# Patient Record
Sex: FEMALE | Race: BLACK OR AFRICAN AMERICAN
[De-identification: names, ages, dates, MRNs, and addresses within clinical notes are randomized per-mention and may not be internally consistent; named-entity substitution may affect disease eponyms.]

---

## 2017-12-08 ENCOUNTER — HOSPITAL ENCOUNTER (EMERGENCY)
Dept: HOSPITAL 92 - ERS | Age: 22
Discharge: HOME | End: 2017-12-08
Payer: COMMERCIAL

## 2017-12-08 DIAGNOSIS — J06.9: Primary | ICD-10-CM

## 2017-12-08 PROCEDURE — 99283 EMERGENCY DEPT VISIT LOW MDM: CPT

## 2018-01-16 ENCOUNTER — HOSPITAL ENCOUNTER (EMERGENCY)
Dept: HOSPITAL 92 - ERS | Age: 23
Discharge: HOME | End: 2018-01-16
Payer: SELF-PAY

## 2018-01-16 DIAGNOSIS — J02.9: Primary | ICD-10-CM

## 2018-01-16 PROCEDURE — 87430 STREP A AG IA: CPT

## 2018-01-16 PROCEDURE — 96372 THER/PROPH/DIAG INJ SC/IM: CPT

## 2018-01-16 PROCEDURE — 87081 CULTURE SCREEN ONLY: CPT

## 2018-01-18 ENCOUNTER — HOSPITAL ENCOUNTER (EMERGENCY)
Dept: HOSPITAL 92 - ERS | Age: 23
Discharge: HOME | End: 2018-01-18
Payer: COMMERCIAL

## 2018-01-18 DIAGNOSIS — R09.81: ICD-10-CM

## 2018-01-18 DIAGNOSIS — J06.9: Primary | ICD-10-CM

## 2018-01-18 DIAGNOSIS — G43.909: ICD-10-CM

## 2018-01-18 PROCEDURE — 99283 EMERGENCY DEPT VISIT LOW MDM: CPT

## 2018-03-26 ENCOUNTER — HOSPITAL ENCOUNTER (EMERGENCY)
Dept: HOSPITAL 92 - ERS | Age: 23
Discharge: HOME | End: 2018-03-26
Payer: SELF-PAY
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
## 2018-03-26 DIAGNOSIS — S00.83XA: Primary | ICD-10-CM
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
## 2018-03-26 DIAGNOSIS — G43.909: ICD-10-CM

## 2018-03-26 LAB
ALBUMIN SERPL BCG-MCNC: 4.6 G/DL (ref 3.5–5)
ALP SERPL-CCNC: 93 U/L (ref 40–150)
ALT SERPL W P-5'-P-CCNC: 28 U/L (ref 8–55)
ANION GAP SERPL CALC-SCNC: 10 MMOL/L (ref 10–20)
APTT PPP: 31.4 SEC (ref 22.9–36.1)
AST SERPL-CCNC: 38 U/L (ref 5–34)
BASOPHILS # BLD AUTO: 0.1 THOU/UL (ref 0–0.2)
BASOPHILS NFR BLD AUTO: 0.9 % (ref 0–1)
BILIRUB SERPL-MCNC: 0.6 MG/DL (ref 0.2–1.2)
BUN SERPL-MCNC: 17 MG/DL (ref 7–18.7)
CALCIUM SERPL-MCNC: 9.9 MG/DL (ref 7.8–10.44)
CHLORIDE SERPL-SCNC: 107 MMOL/L (ref 98–107)
CK MB SERPL-MCNC: 0.7 NG/ML (ref 0–6.6)
CO2 SERPL-SCNC: 25 MMOL/L (ref 22–29)
CREAT CL PREDICTED SERPL C-G-VRATE: 0 ML/MIN (ref 70–130)
EOSINOPHIL # BLD AUTO: 0.1 THOU/UL (ref 0–0.7)
EOSINOPHIL NFR BLD AUTO: 1.3 % (ref 0–10)
GLOBULIN SER CALC-MCNC: 3.4 G/DL (ref 2.4–3.5)
GLUCOSE SERPL-MCNC: 89 MG/DL (ref 70–105)
HGB BLD-MCNC: 12.7 G/DL (ref 12–16)
INR PPP: 1.1
LYMPHOCYTES # BLD: 2.6 THOU/UL (ref 1.2–3.4)
LYMPHOCYTES NFR BLD AUTO: 42.4 % (ref 21–51)
MCH RBC QN AUTO: 30.7 PG (ref 27–31)
MCV RBC AUTO: 92 FL (ref 81–99)
MONOCYTES # BLD AUTO: 0.6 THOU/UL (ref 0.11–0.59)
MONOCYTES NFR BLD AUTO: 9.1 % (ref 0–10)
NEUTROPHILS # BLD AUTO: 2.9 THOU/UL (ref 1.4–6.5)
NEUTROPHILS NFR BLD AUTO: 46.3 % (ref 42–75)
PLATELET # BLD AUTO: 238 THOU/UL (ref 130–400)
POTASSIUM SERPL-SCNC: 3.6 MMOL/L (ref 3.5–5.1)
PREGS CONTROL BACKGROUND?: (no result)
PREGS CONTROL BAR APPEAR?: YES
PROTHROMBIN TIME: 14 SEC (ref 12–14.7)
RBC # BLD AUTO: 4.12 MILL/UL (ref 4.2–5.4)
SODIUM SERPL-SCNC: 138 MMOL/L (ref 136–145)
SP GR UR STRIP: 1.02 (ref 1–1.04)
TROPONIN I SERPL DL<=0.01 NG/ML-MCNC: (no result) NG/ML (ref ?–0.03)
WBC # BLD AUTO: 6.2 THOU/UL (ref 4.8–10.8)

## 2018-03-26 PROCEDURE — 85025 COMPLETE CBC W/AUTO DIFF WBC: CPT

## 2018-03-26 PROCEDURE — 71260 CT THORAX DX C+: CPT

## 2018-03-26 PROCEDURE — 85610 PROTHROMBIN TIME: CPT

## 2018-03-26 PROCEDURE — 84703 CHORIONIC GONADOTROPIN ASSAY: CPT

## 2018-03-26 PROCEDURE — 70450 CT HEAD/BRAIN W/O DYE: CPT

## 2018-03-26 PROCEDURE — 70486 CT MAXILLOFACIAL W/O DYE: CPT

## 2018-03-26 PROCEDURE — 85730 THROMBOPLASTIN TIME PARTIAL: CPT

## 2018-03-26 PROCEDURE — 82553 CREATINE MB FRACTION: CPT

## 2018-03-26 PROCEDURE — 74177 CT ABD & PELVIS W/CONTRAST: CPT

## 2018-03-26 PROCEDURE — 80053 COMPREHEN METABOLIC PANEL: CPT

## 2018-03-26 PROCEDURE — 81003 URINALYSIS AUTO W/O SCOPE: CPT

## 2018-03-26 PROCEDURE — 93005 ELECTROCARDIOGRAM TRACING: CPT

## 2018-03-26 PROCEDURE — 71045 X-RAY EXAM CHEST 1 VIEW: CPT

## 2018-03-26 PROCEDURE — 96374 THER/PROPH/DIAG INJ IV PUSH: CPT

## 2018-03-26 PROCEDURE — 84484 ASSAY OF TROPONIN QUANT: CPT

## 2018-03-26 PROCEDURE — 96361 HYDRATE IV INFUSION ADD-ON: CPT

## 2018-03-26 NOTE — RAD
SINGLE VIEW OF THE CHEST:

 

Comparison: None. 

 

History: Thrown from a horse with chest pain and chest trauma. 

 

FINDINGS:

Single view of the chest shows a normal sized cardiomediastinal silhouette. There is no evidence of c
onsolidation, mass, or pleural effusion. The bones are unremarkable.

 

IMPRESSION:

No evidence of acute cardiopulmonary disease.

 

POS: [facility]

## 2018-03-26 NOTE — CT
CT OF THE CHEST WITH CONTRAST

LIMITED CT OF THE THORACIC SPINE WITH CONTRAST:

 

Comparison: None. 

 

History: Patient fell off a horse and was trampled by the horse. Patient complains of chest and back 
pain. 

 

Technique: 

1. Multiple contiguous axial images were obtained in a CT of the chest with contrast. Coronal reforma
ts were performed. 

2. Limited CT of the thoracic spine was performed. Sagittal and coronal reformats were created off im
ages obtained in the chest CT. 

 

FINDINGS: 

 

CT CHEST:

Heart is normal in size without focal cardiac abnormality. No hilar or mediastinal lymphadenopathy ar
e seen. 

 

No focal infiltrates are seen in the lungs. No pneumothorax or pleural effusion are seen. 

 

The bones of the chest are unremarkable. The chest wall soft tissues are unremarkable. Please see abd
ominal CT for subdiaphragmatic findings. 

 

LIMITED CT OF THE THORACIC SPINE:

Vertebral bodies and intervertebral body discs demonstrate normal height and alignment without fractu
re or subluxation. No degenerative changes are seen. No prevertebral soft tissue is present. 

 

IMPRESSION: 

No evidence of acute intrathoracic abnormality. No evidence of acute osseous abnormality of the thora
cic spine. 

 

 

POS: [facility]

## 2018-03-26 NOTE — CT
CT FACE WITHOUT CONTRAST:

 

Comparison: None. 

 

History: Patient thrown from a horse with facial pain and trauma. 

 

Technique: Multiple contiguous axial images were obtained in a CT of the face without contrast. Sagit
kriss and coronal reformats were performed. 

 

FINDINGS: 

The bones of the face are without evidence of facial fracture. The paranasal sinuses are well aerated
. Temporomandibular joints are symmetric. The mastoid air cells are well aerated. 

 

The globes and retrobulbar soft tissues are unremarkable. There is left periorbital soft tissue swell
ing. A tongue piercing is incidentally seen. 

 

IMPRESSION: 

No evidence of facial fracture. Dr. Valenzuela notified of findings at 8:18 p.m. on 3-26-18. 

 

POS: [facility]

## 2018-03-26 NOTE — CT
CT ABDOMEN AND PELVIS WITH CONTRAST

CT LIMITED OF THE LUMBOSACRAL SPINE WITH CONTRAST:

 

History: Patient was thrown from a horse and complains of back pain and abdominal pain. 

 

Technique: 

1. Multiple contiguous axial images were obtained in a CT of the abdomen with contrast. Coronal refor
mats were performed. 

2. Limited CT of the lumbosacral spine was performed. Sagittal and coronal reformats were created bas
ed off images obtained in the abdominal and pelvic CT. 

 

FINDINGS: 

The gallbladder is contracted. There is a nonspecific calcification in the right kidney measuring 4 m
m in size. The liver, left kidneys, adrenal glands, spleen, and pancreas are unremarkable. No free ai
r or stranding changes are seen in the abdomen or pelvis. A small amount of free fluid in the pelvis 
is likely physiologic. 

 

The large and small bowel are unremarkable. There is a 2.4 cm hypodensity associated with vaginal wal
l which may represent a cyst. The reproductive organs are otherwise unremarkable. No abdominal or pel
agnes lymphadenopathy are seen. 

 

The bones of the pelvis and visualized lower ribs are unremarkable.

 

LIMITED CT OF THE LUMBOSACRAL SPINE:

The vertebral bodies and intervertebral discs demonstrate normal height and alignment without fractur
e or subluxation. No degenerative changes are seen. No prevertebral soft tissue swelling is present. 


 

IMPRESSION: 

1. No evidence of acute intraabdominal/pelvic abnormality. 

2. Nonobstructing right renal calcification. 

3. Cystic structure associated with the vagina may represent a Bartholin gland cyst. 

4. No evidence of acute osseous abnormality of the lumbosacral spine.

 

POS: [facility]

## 2018-04-18 NOTE — EKG
Test Reason : 

Blood Pressure : ***/*** mmHG

Vent. Rate : 073 BPM     Atrial Rate : 073 BPM

   P-R Int : 178 ms          QRS Dur : 082 ms

    QT Int : 366 ms       P-R-T Axes : 065 080 038 degrees

   QTc Int : 403 ms

 

Normal sinus rhythm with sinus arrhythmia

Normal ECG

 

Confirmed by DARELL CARR ([id number]),  YFN KEN ([id number]) on 4/18/2018 3:05:13 PM

 

Referred By:             Confirmed By:DARELL CARR

## 2018-04-22 ENCOUNTER — HOSPITAL ENCOUNTER (EMERGENCY)
Dept: HOSPITAL 92 - ERS | Age: 23
Discharge: HOME | End: 2018-04-22
Payer: SELF-PAY

## 2018-04-22 DIAGNOSIS — N13.2: Primary | ICD-10-CM

## 2018-04-22 DIAGNOSIS — G43.909: ICD-10-CM

## 2018-04-22 LAB
ALBUMIN SERPL BCG-MCNC: 4.8 G/DL (ref 3.5–5)
ALP SERPL-CCNC: 88 U/L (ref 40–150)
ALT SERPL W P-5'-P-CCNC: 16 U/L (ref 8–55)
AMYLASE SERPL-CCNC: 58 U/L (ref 25–125)
ANION GAP SERPL CALC-SCNC: 14 MMOL/L (ref 10–20)
AST SERPL-CCNC: 20 U/L (ref 5–34)
BASOPHILS # BLD AUTO: 0 THOU/UL (ref 0–0.2)
BASOPHILS NFR BLD AUTO: 0.6 % (ref 0–1)
BILIRUB SERPL-MCNC: 1.9 MG/DL (ref 0.2–1.2)
BUN SERPL-MCNC: 20 MG/DL (ref 7–18.7)
CALCIUM SERPL-MCNC: 9.7 MG/DL (ref 7.8–10.44)
CHLORIDE SERPL-SCNC: 103 MMOL/L (ref 98–107)
CK SERPL-CCNC: 80 U/L (ref 29–168)
CO2 SERPL-SCNC: 24 MMOL/L (ref 22–29)
CREAT CL PREDICTED SERPL C-G-VRATE: 0 ML/MIN (ref 70–130)
CRYSTAL-AUWI FLAG: 0 (ref 0–15)
EOSINOPHIL # BLD AUTO: 0.1 THOU/UL (ref 0–0.7)
EOSINOPHIL NFR BLD AUTO: 1 % (ref 0–10)
GLOBULIN SER CALC-MCNC: 3.5 G/DL (ref 2.4–3.5)
GLUCOSE SERPL-MCNC: 97 MG/DL (ref 70–105)
HEV IGM SER QL: 6.3 (ref 0–7.99)
HGB BLD-MCNC: 13 G/DL (ref 12–16)
HYALINE CASTS #/AREA URNS LPF: (no result) LPF
LIPASE SERPL-CCNC: 6 U/L (ref 8–78)
LYMPHOCYTES # BLD: 2.3 THOU/UL (ref 1.2–3.4)
LYMPHOCYTES NFR BLD AUTO: 38.4 % (ref 21–51)
MCH RBC QN AUTO: 31.6 PG (ref 27–31)
MCV RBC AUTO: 92.1 FL (ref 81–99)
MONOCYTES # BLD AUTO: 0.6 THOU/UL (ref 0.11–0.59)
MONOCYTES NFR BLD AUTO: 9.8 % (ref 0–10)
NEUTROPHILS # BLD AUTO: 3.1 THOU/UL (ref 1.4–6.5)
NEUTROPHILS NFR BLD AUTO: 50.2 % (ref 42–75)
PATHC CAST-AUWI FLAG: 2.1 (ref 0–2.49)
PLATELET # BLD AUTO: 245 THOU/UL (ref 130–400)
POTASSIUM SERPL-SCNC: 3.3 MMOL/L (ref 3.5–5.1)
PREGU CONTROL BACKGROUND?: (no result)
PREGU CONTROL BAR APPEAR?: YES
PROT UR STRIP.AUTO-MCNC: 100 MG/DL
RBC # BLD AUTO: 4.11 MILL/UL (ref 4.2–5.4)
RBC UR QL AUTO: (no result) HPF (ref 0–3)
SODIUM SERPL-SCNC: 138 MMOL/L (ref 136–145)
SP GR UR STRIP: 1.03 (ref 1–1.04)
SPERM-AUWI FLAG: 0 (ref 0–9.9)
WBC # BLD AUTO: 6.1 THOU/UL (ref 4.8–10.8)
WBC UR QL AUTO: (no result) HPF (ref 0–3)
YEAST-AUWI FLAG: 0 (ref 0–25)

## 2018-04-22 PROCEDURE — 81025 URINE PREGNANCY TEST: CPT

## 2018-04-22 PROCEDURE — 74177 CT ABD & PELVIS W/CONTRAST: CPT

## 2018-04-22 PROCEDURE — 96375 TX/PRO/DX INJ NEW DRUG ADDON: CPT

## 2018-04-22 PROCEDURE — 36415 COLL VENOUS BLD VENIPUNCTURE: CPT

## 2018-04-22 PROCEDURE — 96361 HYDRATE IV INFUSION ADD-ON: CPT

## 2018-04-22 PROCEDURE — 83690 ASSAY OF LIPASE: CPT

## 2018-04-22 PROCEDURE — 96374 THER/PROPH/DIAG INJ IV PUSH: CPT

## 2018-04-22 PROCEDURE — 87086 URINE CULTURE/COLONY COUNT: CPT

## 2018-04-22 PROCEDURE — 81015 MICROSCOPIC EXAM OF URINE: CPT

## 2018-04-22 PROCEDURE — 82550 ASSAY OF CK (CPK): CPT

## 2018-04-22 PROCEDURE — 80053 COMPREHEN METABOLIC PANEL: CPT

## 2018-04-22 PROCEDURE — 81003 URINALYSIS AUTO W/O SCOPE: CPT

## 2018-04-22 PROCEDURE — 82150 ASSAY OF AMYLASE: CPT

## 2018-04-22 PROCEDURE — 85025 COMPLETE CBC W/AUTO DIFF WBC: CPT

## 2018-04-22 NOTE — CT
CT ABDOMEN AND PELVIS WITH IV CONTRAST:

 

04/22/2018

 

HISTORY:

Right lower quadrant abdominal pain.

 

COMPARISON:   03/26/2018

 

FINDINGS:

There is mild right hydronephrosis and hydroureter, with delayed nephrogram phase of enhancement on t
he right.  There is a right UVJ calculus, measuring 4 mm.  This calculus was previously noted to be w
ithin the superior pole right renal collecting system.

 

The lung bases, liver, spleen, pancreas, bilateral adrenal glands, left kidney, and abdominal aorta d
emonstrate a normal CT appearance.

 

The urinary bladder is decompressed.

 

The uterus is heterogeneous in appearance and has a stable appearance from the prior exam.  There is 
a low density structure within the left adnexa, with a surrounding rim of enhancement.  This is irreg
ularity shaped and measures 1.5 cm.  This probably represents an involuting left renal cyst or domina
nt follicle.

 

There is a small amount of free fluid in the pelvis, which is more than typically expected for normal
 physiological fluid.

 

Only a very small portion of the appendix is seen and is gas-filled and visualized portions do appear
 normal in caliber.

 

There are prominent bilateral gonadal veins again seen.  There is a stable low density cystic structu
re adjacent to the vagina, probably related to a Bartholin gland cyst, unchanged from prior study.

 

IMPRESSION:

1.  Partially obstructing right UVJ calculus, measuring 4 mm, with resultant mild hydronephrosis and 
delayed nephrogram phase of enhancement.

2.  Difficulty in visualization of the appendix but, where seen, the appendix does appear normal in c
aliber.

3.  Small amount of free fluid in the pelvis, which is more than expected for normal physiological fl
uid.

4.  Probable involuting dominant follicle/cyst involving the left ovary.

5.  Findings likely related to a Bartholin gland cyst.

 

POS: [facility]

## 2018-08-27 ENCOUNTER — HOSPITAL ENCOUNTER (EMERGENCY)
Dept: HOSPITAL 92 - ERS | Age: 23
Discharge: HOME | End: 2018-08-27
Payer: SELF-PAY

## 2018-08-27 DIAGNOSIS — G43.909: ICD-10-CM

## 2018-08-27 DIAGNOSIS — J30.9: Primary | ICD-10-CM

## 2018-08-27 PROCEDURE — 99282 EMERGENCY DEPT VISIT SF MDM: CPT

## 2018-08-28 ENCOUNTER — HOSPITAL ENCOUNTER (EMERGENCY)
Dept: HOSPITAL 92 - ERS | Age: 23
Discharge: HOME | End: 2018-08-28
Payer: SELF-PAY

## 2018-08-28 DIAGNOSIS — J30.9: Primary | ICD-10-CM

## 2018-08-28 DIAGNOSIS — G43.909: ICD-10-CM

## 2018-08-28 DIAGNOSIS — Z79.899: ICD-10-CM

## 2018-08-28 PROCEDURE — 71046 X-RAY EXAM CHEST 2 VIEWS: CPT

## 2018-08-28 NOTE — RAD
CHEST TWO VIEWS:

8/28/18

 

HISTORY: 

Cough. 

 

FINDINGS:  

The cardiac silhouette and pulmonary vasculature are unremarkable. Mediastinum is midline. No conflue
nt air space consolidation, pneumothorax, or pleural fluid.

 

IMPRESSION:  

No active cardiopulmonary abnormalities are demonstrated. 

 

POS: [facility]

## 2018-10-07 ENCOUNTER — HOSPITAL ENCOUNTER (EMERGENCY)
Dept: HOSPITAL 92 - ERS | Age: 23
LOS: 1 days | Discharge: HOME | End: 2018-10-08
Payer: SELF-PAY

## 2018-10-07 ENCOUNTER — HOSPITAL ENCOUNTER (EMERGENCY)
Dept: HOSPITAL 9 - MADERS | Age: 23
Discharge: HOME | End: 2018-10-07
Payer: SELF-PAY

## 2018-10-07 DIAGNOSIS — F43.9: Primary | ICD-10-CM

## 2018-10-07 DIAGNOSIS — Z79.899: ICD-10-CM

## 2018-10-07 DIAGNOSIS — Z87.442: ICD-10-CM

## 2018-10-07 DIAGNOSIS — F41.1: Primary | ICD-10-CM

## 2018-10-07 DIAGNOSIS — G43.909: ICD-10-CM

## 2018-10-07 LAB
ALBUMIN SERPL BCG-MCNC: 4.5 G/DL (ref 3.5–5)
ALP SERPL-CCNC: 75 U/L (ref 40–150)
ALT SERPL W P-5'-P-CCNC: 15 U/L (ref 8–55)
ANION GAP SERPL CALC-SCNC: 16 MMOL/L (ref 10–20)
AST SERPL-CCNC: 17 U/L (ref 5–34)
BASOPHILS # BLD AUTO: 0.1 THOU/UL (ref 0–0.2)
BASOPHILS NFR BLD AUTO: 2 % (ref 0–1)
BILIRUB SERPL-MCNC: 1.5 MG/DL (ref 0.2–1.2)
BUN SERPL-MCNC: 10 MG/DL (ref 7–18.7)
CALCIUM SERPL-MCNC: 10 MG/DL (ref 7.8–10.44)
CHLORIDE SERPL-SCNC: 109 MMOL/L (ref 98–107)
CO2 SERPL-SCNC: 19 MMOL/L (ref 22–29)
CREAT CL PREDICTED SERPL C-G-VRATE: 0 ML/MIN (ref 70–130)
DRUG SCREEN CUTOFF: (no result)
EOSINOPHIL # BLD AUTO: 0 THOU/UL (ref 0–0.7)
EOSINOPHIL NFR BLD AUTO: 0.2 % (ref 0–10)
GLOBULIN SER CALC-MCNC: 3.3 G/DL (ref 2.4–3.5)
GLUCOSE SERPL-MCNC: 96 MG/DL (ref 70–105)
HGB BLD-MCNC: 12 G/DL (ref 12–16)
LYMPHOCYTES # BLD AUTO: 2.5 THOU/UL (ref 1.2–3.4)
LYMPHOCYTES NFR BLD AUTO: 43.9 % (ref 21–51)
MCH RBC QN AUTO: 29.9 PG (ref 27–31)
MCV RBC AUTO: 91.4 FL (ref 78–98)
MEDTOX CONTROL LINE VALID?: (no result)
MONOCYTES # BLD AUTO: 0.5 THOU/UL (ref 0.11–0.59)
MONOCYTES NFR BLD AUTO: 9.4 % (ref 0–10)
NEUTROPHILS # BLD AUTO: 2.5 THOU/UL (ref 1.4–6.5)
NEUTROPHILS NFR BLD AUTO: 44.4 % (ref 42–75)
PLATELET # BLD AUTO: 285 THOU/UL (ref 130–400)
POTASSIUM SERPL-SCNC: 3 MMOL/L (ref 3.5–5.1)
PREGU CONTROL BACKGROUND?: (no result)
PREGU CONTROL BAR APPEAR?: YES
RBC # BLD AUTO: 4.02 MILL/UL (ref 4.2–5.4)
SODIUM SERPL-SCNC: 141 MMOL/L (ref 136–145)
WBC # BLD AUTO: 5.6 THOU/UL (ref 4.8–10.8)

## 2018-10-07 PROCEDURE — 81025 URINE PREGNANCY TEST: CPT

## 2018-10-07 PROCEDURE — 80306 DRUG TEST PRSMV INSTRMNT: CPT

## 2018-10-07 PROCEDURE — 36415 COLL VENOUS BLD VENIPUNCTURE: CPT

## 2018-10-07 PROCEDURE — 96374 THER/PROPH/DIAG INJ IV PUSH: CPT

## 2018-10-07 PROCEDURE — 93005 ELECTROCARDIOGRAM TRACING: CPT

## 2018-10-07 PROCEDURE — 84443 ASSAY THYROID STIM HORMONE: CPT

## 2018-10-07 PROCEDURE — 85025 COMPLETE CBC W/AUTO DIFF WBC: CPT

## 2018-10-07 PROCEDURE — 80053 COMPREHEN METABOLIC PANEL: CPT

## 2018-10-07 PROCEDURE — 71046 X-RAY EXAM CHEST 2 VIEWS: CPT

## 2018-10-07 PROCEDURE — 96375 TX/PRO/DX INJ NEW DRUG ADDON: CPT

## 2018-10-07 NOTE — RAD
CHEST TWO VIEWS:

 

10/07/2018

 

HISTORY:

Short of breath.

 

COMPARISON:

08/28/2018

 

FINDINGS:

The lungs are clear.  Heart and mediastinal contours are unremarkable.

 

IMPRESSION:

No acute findings.

 

POS: [facility]

## 2019-01-29 ENCOUNTER — HOSPITAL ENCOUNTER (EMERGENCY)
Dept: HOSPITAL 92 - ERS | Age: 24
Discharge: HOME | End: 2019-01-29
Payer: SELF-PAY

## 2019-01-29 DIAGNOSIS — G43.909: ICD-10-CM

## 2019-01-29 DIAGNOSIS — H10.9: Primary | ICD-10-CM

## 2019-01-29 PROCEDURE — 99282 EMERGENCY DEPT VISIT SF MDM: CPT

## 2020-07-08 ENCOUNTER — HOSPITAL ENCOUNTER (EMERGENCY)
Dept: HOSPITAL 92 - ERS | Age: 25
Discharge: LEFT BEFORE DISCHARGE | End: 2020-07-08
Payer: SELF-PAY

## 2020-07-08 DIAGNOSIS — Z53.21: Primary | ICD-10-CM

## 2020-07-08 LAB
ALBUMIN SERPL BCG-MCNC: 4.3 G/DL (ref 3.5–5)
ALP SERPL-CCNC: 63 U/L (ref 40–110)
ALT SERPL W P-5'-P-CCNC: 11 U/L (ref 8–55)
ANION GAP SERPL CALC-SCNC: 12 MMOL/L (ref 10–20)
AST SERPL-CCNC: 20 U/L (ref 5–34)
BASOPHILS # BLD AUTO: 0 THOU/UL (ref 0–0.2)
BASOPHILS NFR BLD AUTO: 0.5 % (ref 0–1)
BILIRUB SERPL-MCNC: 1.2 MG/DL (ref 0.2–1.2)
BUN SERPL-MCNC: 13 MG/DL (ref 7–18.7)
CALCIUM SERPL-MCNC: 9.3 MG/DL (ref 7.8–10.44)
CHLORIDE SERPL-SCNC: 106 MMOL/L (ref 98–107)
CO2 SERPL-SCNC: 23 MMOL/L (ref 22–29)
CREAT CL PREDICTED SERPL C-G-VRATE: 0 ML/MIN (ref 70–130)
EOSINOPHIL # BLD AUTO: 0.1 THOU/UL (ref 0–0.7)
EOSINOPHIL NFR BLD AUTO: 1.8 % (ref 0–10)
GLOBULIN SER CALC-MCNC: 3 G/DL (ref 2.4–3.5)
GLUCOSE SERPL-MCNC: 73 MG/DL (ref 70–105)
HGB BLD-MCNC: 12.5 G/DL (ref 12–16)
LIPASE SERPL-CCNC: 4 U/L (ref 8–78)
LYMPHOCYTES # BLD: 1.9 THOU/UL (ref 1.2–3.4)
LYMPHOCYTES NFR BLD AUTO: 45.7 % (ref 21–51)
MCH RBC QN AUTO: 32 PG (ref 27–31)
MCV RBC AUTO: 93.9 FL (ref 78–98)
MONOCYTES # BLD AUTO: 0.5 THOU/UL (ref 0.11–0.59)
MONOCYTES NFR BLD AUTO: 11.5 % (ref 0–10)
NEUTROPHILS # BLD AUTO: 1.7 THOU/UL (ref 1.4–6.5)
NEUTROPHILS NFR BLD AUTO: 40.5 % (ref 42–75)
PLATELET # BLD AUTO: 212 THOU/UL (ref 130–400)
POTASSIUM SERPL-SCNC: 3.6 MMOL/L (ref 3.5–5.1)
PREGS CONTROL BACKGROUND?: (no result)
PREGS CONTROL BAR APPEAR?: YES
RBC # BLD AUTO: 3.9 MILL/UL (ref 4.2–5.4)
SODIUM SERPL-SCNC: 137 MMOL/L (ref 136–145)
WBC # BLD AUTO: 4.2 THOU/UL (ref 4.8–10.8)

## 2020-07-08 PROCEDURE — 84703 CHORIONIC GONADOTROPIN ASSAY: CPT

## 2020-07-08 PROCEDURE — 83690 ASSAY OF LIPASE: CPT

## 2020-07-08 PROCEDURE — 36415 COLL VENOUS BLD VENIPUNCTURE: CPT

## 2020-07-08 PROCEDURE — 80053 COMPREHEN METABOLIC PANEL: CPT

## 2021-09-07 NOTE — CT
CT BRAIN WITHOUT CONTRAST: 

 

History: Back pain, chest pain, and headache after falling from a horse. Head trauma. 

 

Technique: Multiple contiguous axial images were obtained in a CT of the brain without contrast. 

 

FINDINGS: 

Brain is normal in morphology and attenuation without focal lesions or confluent areas of infarction.
 There is no evidence of hydrocephalus, intracranial hemorrhage, or extraaxial fluid collections. 

 

The calvarium and overlying soft tissues are unremarkable. The visualized paranasal sinuses and masto
id air cells are well aerated. 

 

IMPRESSION: 

No evidence of acute intracranial abnormality. Dr. Valenzuela noted of the findings at 8:11 on 3-26-18.

 

POS: [facility] notified.

## 2023-06-18 ENCOUNTER — HOSPITAL ENCOUNTER (EMERGENCY)
Dept: HOSPITAL 92 - CSHERS | Age: 28
Discharge: HOME | End: 2023-06-18
Payer: SELF-PAY

## 2023-06-18 DIAGNOSIS — M62.838: Primary | ICD-10-CM

## 2023-06-18 PROCEDURE — 96372 THER/PROPH/DIAG INJ SC/IM: CPT

## 2023-06-18 PROCEDURE — 99283 EMERGENCY DEPT VISIT LOW MDM: CPT

## 2024-09-29 ENCOUNTER — HOSPITAL ENCOUNTER (EMERGENCY)
Dept: HOSPITAL 92 - CSHERS | Age: 29
LOS: 1 days | Discharge: HOME | End: 2024-09-30
Payer: SELF-PAY
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
## 2024-09-29 DIAGNOSIS — S62.667A: Primary | ICD-10-CM

## 2024-09-29 DIAGNOSIS — S61.011A: ICD-10-CM

## 2024-09-29 PROCEDURE — 99283 EMERGENCY DEPT VISIT LOW MDM: CPT

## 2024-09-29 PROCEDURE — 12002 RPR S/N/AX/GEN/TRNK2.6-7.5CM: CPT
